# Patient Record
Sex: FEMALE
[De-identification: names, ages, dates, MRNs, and addresses within clinical notes are randomized per-mention and may not be internally consistent; named-entity substitution may affect disease eponyms.]

---

## 2023-01-04 PROBLEM — Z00.00 ENCOUNTER FOR PREVENTIVE HEALTH EXAMINATION: Status: ACTIVE | Noted: 2023-01-04

## 2023-01-05 ENCOUNTER — APPOINTMENT (OUTPATIENT)
Dept: ORTHOPEDIC SURGERY | Facility: CLINIC | Age: 53
End: 2023-01-05
Payer: COMMERCIAL

## 2023-01-05 VITALS — HEIGHT: 59 IN | BODY MASS INDEX: 20.56 KG/M2 | WEIGHT: 102 LBS

## 2023-01-05 DIAGNOSIS — S76.311A STRAIN OF MUSCLE, FASCIA AND TENDON OF THE POSTERIOR MUSCLE GROUP AT THIGH LEVEL, RIGHT THIGH, INITIAL ENCOUNTER: ICD-10-CM

## 2023-01-05 DIAGNOSIS — Z78.9 OTHER SPECIFIED HEALTH STATUS: ICD-10-CM

## 2023-01-05 PROCEDURE — 99203 OFFICE O/P NEW LOW 30 MIN: CPT

## 2023-01-05 PROCEDURE — 73564 X-RAY EXAM KNEE 4 OR MORE: CPT | Mod: RT

## 2023-01-05 NOTE — DISCUSSION/SUMMARY
[de-identified] : 53yo f with right hamstring strain\par 1) will start a course of PT\par 2) Aleve as needed\par 3) cryotherapy\par 4) rto in 4 weeks, if pain persists will consider MRI\par \par \par Entered by PATRICIA Vasquez acting as scribe.\par -\par The documentation recorded by the scribe accurately reflects the service I personally performed and the decisions made by me.\par

## 2023-01-05 NOTE — IMAGING
[Right] : right knee [All Views] : anteroposterior, lateral, skyline, and anteroposterior standing [Mild tricompartmental OA medial narrowing] : Mild tricompartmental OA medial narrowing

## 2023-01-05 NOTE — PHYSICAL EXAM
[Right] : right knee [5___] : hamstring 5[unfilled]/5 [Negative] : negative posterior draw [] : no extensor lag [TWNoteComboBox7] : flexion 130 degrees [de-identified] : extension 0 degrees

## 2023-01-17 ENCOUNTER — APPOINTMENT (OUTPATIENT)
Dept: ORTHOPEDIC SURGERY | Facility: CLINIC | Age: 53
End: 2023-01-17

## 2023-03-16 ENCOUNTER — APPOINTMENT (OUTPATIENT)
Dept: ORTHOPEDIC SURGERY | Facility: CLINIC | Age: 53
End: 2023-03-16
Payer: COMMERCIAL

## 2023-03-16 VITALS — HEIGHT: 59 IN | BODY MASS INDEX: 20.56 KG/M2 | WEIGHT: 102 LBS

## 2023-03-16 DIAGNOSIS — S76.311D STRAIN OF MUSCLE, FASCIA AND TENDON OF THE POSTERIOR MUSCLE GROUP AT THIGH LEVEL, RIGHT THIGH, SUBSEQUENT ENCOUNTER: ICD-10-CM

## 2023-03-16 DIAGNOSIS — M25.469 EFFUSION, UNSPECIFIED KNEE: ICD-10-CM

## 2023-03-16 PROCEDURE — 99213 OFFICE O/P EST LOW 20 MIN: CPT

## 2023-03-16 NOTE — HISTORY OF PRESENT ILLNESS
[2] : 2 [0] : 0 [Dull/Aching] : dull/aching [Sharp] : sharp [Constant] : constant [de-identified] : 3/16/23-Pain improved with PT, although reports swelling of the knee/medial knee pain while playing pickle ball on vacation this past week. Continued pain in posterior mid-thigh as well. Denies feeling of instability. \par 1/5/2023: 51yo f with right knee pain ongoing for months. Reports intermittent sewlling. Pain became worse last week after playing pickle ball. Pain is worse with standing from a seated position. Has a feeling on instability. Wears a brace for support. Takes Advil with some relief. \par \par  [] : no [FreeTextEntry5] : pt is 52 years old female who present evaluation of the right knee pt states she has been experiencing pain but after she play a game it white ,me worse [de-identified] : squatting [de-identified] : physical therapy

## 2023-03-16 NOTE — PHYSICAL EXAM
[Right] : right knee [5___] : hamstring 5[unfilled]/5 [Negative] : negative posterior draw [] : no extensor lag [TWNoteComboBox7] : flexion 130 degrees [de-identified] : extension 0 degrees

## 2023-03-20 ENCOUNTER — FORM ENCOUNTER (OUTPATIENT)
Age: 53
End: 2023-03-20

## 2023-03-21 ENCOUNTER — APPOINTMENT (OUTPATIENT)
Dept: MRI IMAGING | Facility: CLINIC | Age: 53
End: 2023-03-21
Payer: COMMERCIAL

## 2023-03-21 PROCEDURE — 73721 MRI JNT OF LWR EXTRE W/O DYE: CPT | Mod: RT

## 2023-03-28 ENCOUNTER — APPOINTMENT (OUTPATIENT)
Dept: ORTHOPEDIC SURGERY | Facility: CLINIC | Age: 53
End: 2023-03-28
Payer: COMMERCIAL

## 2023-03-28 DIAGNOSIS — M23.91 UNSPECIFIED INTERNAL DERANGEMENT OF RIGHT KNEE: ICD-10-CM

## 2023-03-28 PROCEDURE — 99214 OFFICE O/P EST MOD 30 MIN: CPT

## 2023-03-28 NOTE — DATA REVIEWED
[MRI] : MRI [Right] : of the right [Knee] : knee [Report was reviewed and noted in the chart] : The report was reviewed and noted in the chart [I independently reviewed and interpreted images and report] : I independently reviewed and interpreted images and report [I reviewed the films/CD] : I reviewed the films/CD [FreeTextEntry1] : 03.21.23\par 1. Findings raising suspicion of posterior medial meniscal tear; however, postoperative changes could have a similar appearance and cannot be excluded in this area with degeneration involving the remaining posterior horn and mid zone and mild surrounding synovitis posteriorly.\par 2. Mild chronic appearing ligament sprains, chondral loss and joint space narrowing in the medial and \par patellofemoral compartments and mild subchondral edema in the medial tibial plateau and medial femoral \par trochlea with mild-to-moderate effusion.\par 3. No acute fracture or lateral meniscal tear.

## 2023-03-28 NOTE — DISCUSSION/SUMMARY
[de-identified] : 52f with medial right knee pain and suspicion of mmt on MRI. hx of right knee surgery in the remote past. Improving pain and minimal instability complaints. \par \par 1) c/w hinged knee brace\par 2) cryotherapy, rest and activity modification\par 3) discussed the availability of csi or resuming PT if pain worsens\par \par Entered by Diane Vizcaino acting as scribe.\par Dr. Quinones-\par The documentation recorded by the scribe accurately reflects the service I personally performed and the decisions made by me.

## 2023-03-28 NOTE — HISTORY OF PRESENT ILLNESS
[2] : 2 [0] : 0 [Dull/Aching] : dull/aching [Sharp] : sharp [Constant] : constant [de-identified] : 3/28/23: Here to f/up right knee and review MRI results. Reports mild medial pain and only occasional sense of instability. Wears a hinged knee brace which allows her to play pickle ball. \par 3/16/23-Pain improved with PT, although reports swelling of the knee/medial knee pain while playing pickle ball on vacation this past week. Continued pain in posterior mid-thigh as well. Denies feeling of instability. \par 1/5/2023: 53yo f with right knee pain ongoing for months. Reports intermittent sewlling. Pain became worse last week after playing pickle ball. Pain is worse with standing from a seated position. Has a feeling on instability. Wears a brace for support. Takes Advil with some relief. \par \par  [] : no [FreeTextEntry5] : pt is 52 years old female who present evaluation of the right knee pt states she has been experiencing pain but after she play a game it white ,me worse [de-identified] : squatting [de-identified] : physical therapy, MRI

## 2023-03-28 NOTE — PHYSICAL EXAM
[Right] : right knee [5___] : hamstring 5[unfilled]/5 [Negative] : negative posterior draw [] : no extensor lag [TWNoteComboBox7] : flexion 130 degrees [de-identified] : extension 0 degrees